# Patient Record
(demographics unavailable — no encounter records)

---

## 2024-10-25 NOTE — DATA REVIEWED
[FreeTextEntry1] : Labs:  10/18/2024:  A1c 5.7% TSH 0.09 Free T4  1.4  5/31/2023: Glucose 103  A1c 5.3 TSH 0.91 Free T4  1.3   5/26/2022:  TSH 0.88 Free T4  1.7 A1c 5.4%  1/28/2021: Glucose 100 A1c 5.5% TSH  0.51 Free T4  1.4  7/2/2020: TSH 1.55 Free T4  1.5  1/8/2020: TSH  0.17 Free T4  1.7 A1c  5.6 25(OH)D  41 25(OH)D  43  1/22/2019 glucose  99,  A1c  5.7 TSH  0.6, free T4  1.5 25(OH)D  38

## 2024-10-25 NOTE — ASSESSMENT
[FreeTextEntry1] : 64 year old female with hypothyroidism secondary to total thyroidectomy for benign disease, prediabetes and Hyperlipidemia here for follow up.  Recent labs show slight thyrotoxicosis on LT4 therapy after dose was changed.  1.  Hypothyroidism-  Reduce Synthroid back to 112 mcg daily (brand name is medically necessary as TSH levels are labile on generic LT4 in the past).  Repeat TFTs in 3 months.    2.  Impaired fasting glucose-   Continue lifestyle modification.   3.  Hyperlipidemia-  Continue lifestyle intervention.     Follow up in 3 months.

## 2024-10-25 NOTE — HISTORY OF PRESENT ILLNESS
[FreeTextEntry1] : Here for a routine follow up visit of hypothyroidism and IFG.  Quality:  hypothyroidism s/p total thyroidectomy for benign multinodular goiter (left nodule was over 4 cm in size) Severity: moderate Duration: years Modifying factors:  better with Synthroid  (had hair loss with generic LT4)  Current Regimen: Last refill was sent LT4 125 mcg daily, but was previously on 112 mcg daily.     Reports increasing palpitations recently.   Saw PCP and had EKG which was normal.    C/o weight gain and fatigue.

## 2024-10-25 NOTE — REVIEW OF SYSTEMS
[Fatigue] : fatigue [Recent Weight Gain (___ Lbs)] : recent weight gain: [unfilled] lbs [Palpitations] : palpitations [Tremors] : no tremors [Hot Flashes] : hot flashes

## 2024-10-25 NOTE — PHYSICAL EXAM
[Healthy Appearance] : healthy appearance [No Acute Distress] : no acute distress [Normal Sclera/Conjunctiva] : normal sclera/conjunctiva [No Proptosis] : no proptosis [No LAD] : no lymphadenopathy [Well Healed Scar] : well healed scar [No Respiratory Distress] : no respiratory distress [Clear to Auscultation] : lungs were clear to auscultation bilaterally [Normal S1, S2] : normal S1 and S2 [No Murmurs] : no murmurs [Normal Rate] : heart rate was normal [Regular Rhythm] : with a regular rhythm [Acanthosis Nigricans] : no acanthosis nigricans [Normal Affect] : the affect was normal [Normal Insight/Judgement] : insight and judgment were intact [Normal Mood] : the mood was normal [de-identified] : No palpable thyroid tissue

## 2025-01-24 NOTE — ASSESSMENT
[FreeTextEntry1] : 64 year old female with hypothyroidism secondary to total thyroidectomy for benign disease, prediabetes and Hyperlipidemia here for follow up.   Her TSH remains slightly suppressed on current dose of LT4.    1.  Hypothyroidism-   Reduce Synthroid to 100 mcg daily (2 boxes of samples provided to patient).  Repeat TFTs in 3 months. 2.  Impaired fasting glucose-   Continue lifestyle modification.   A1c is improving.   Follow up in 6 months.

## 2025-01-24 NOTE — HISTORY OF PRESENT ILLNESS
[FreeTextEntry1] : Here for a routine follow up visit of hypothyroidism and IFG. 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   Quality:  hypothyroidism s/p total thyroidectomy for benign multinodular goiter (left nodule was over 4 cm in size) Severity: moderate Duration: years Modifying factors:  better with Synthroid  (had hair loss with generic LT4)  Current Regimen: Synthroid 112 mcg daily (dose lowered last visit).   Had labile TSH levels on generic forms of LT4 in the past.

## 2025-01-24 NOTE — PHYSICAL EXAM
[Healthy Appearance] : healthy appearance [No Acute Distress] : no acute distress [Normal Sclera/Conjunctiva] : normal sclera/conjunctiva [No Proptosis] : no proptosis [No LAD] : no lymphadenopathy [Well Healed Scar] : well healed scar [No Respiratory Distress] : no respiratory distress [Clear to Auscultation] : lungs were clear to auscultation bilaterally [Normal S1, S2] : normal S1 and S2 [No Murmurs] : no murmurs [Normal Rate] : heart rate was normal [Regular Rhythm] : with a regular rhythm [Normal Affect] : the affect was normal [Normal Insight/Judgement] : insight and judgment were intact [Normal Mood] : the mood was normal [Acanthosis Nigricans] : no acanthosis nigricans [de-identified] : No palpable thyroid tissue

## 2025-01-24 NOTE — DATA REVIEWED
[FreeTextEntry1] : Labs:  1/3/2025: TSH 0.34 Free T4  1.4 Total T3  111  A1c 5.5  10/18/2024:  A1c 5.7% TSH 0.09 Free T4  1.4  5/31/2023: Glucose 103  A1c 5.3 TSH 0.91 Free T4  1.3   5/26/2022:  TSH 0.88 Free T4  1.7 A1c 5.4%  1/28/2021: Glucose 100 A1c 5.5% TSH  0.51 Free T4  1.4

## 2025-07-24 NOTE — ASSESSMENT
[FreeTextEntry1] : 64 year old female with hypothyroidism secondary to total thyroidectomy for benign disease, prediabetes and Hyperlipidemia here for follow up.  She is clinically and biochemically euthyroid on LT4 therapy.     1.  Hypothyroidism-   Continue current dose of Synthroid.    2.  Impaired fasting glucose-   Continue lifestyle modification.      Follow up in 6 months.

## 2025-07-24 NOTE — HISTORY OF PRESENT ILLNESS
[FreeTextEntry1] : Here for a routine follow up visit of hypothyroidism and IFG. PMH of DCIS s/p b/l lumpectomy and now having XRT.   Quality:  hypothyroidism s/p total thyroidectomy for benign multinodular goiter (left nodule was over 4 cm in size) Severity: moderate Duration: years Modifying factors:  better with Synthroid  (had hair loss with generic LT4)  Current Regimen: Synthroid 100 mcg daily (dose lowered last visit).   Had labile TSH levels on generic forms of LT4 in the past.      Recently getting some hot flashes after starting Arimidex.     Will be retiring in a few months.

## 2025-07-24 NOTE — PHYSICAL EXAM
[Healthy Appearance] : healthy appearance [No Acute Distress] : no acute distress [Normal Sclera/Conjunctiva] : normal sclera/conjunctiva [No Proptosis] : no proptosis [No LAD] : no lymphadenopathy [Well Healed Scar] : well healed scar [No Respiratory Distress] : no respiratory distress [Clear to Auscultation] : lungs were clear to auscultation bilaterally [Normal S1, S2] : normal S1 and S2 [No Murmurs] : no murmurs [Normal Rate] : heart rate was normal [Regular Rhythm] : with a regular rhythm [Normal Affect] : the affect was normal [Normal Insight/Judgement] : insight and judgment were intact [Normal Mood] : the mood was normal [Acanthosis Nigricans] : no acanthosis nigricans [de-identified] : No palpable thyroid tissue

## 2025-07-24 NOTE — REVIEW OF SYSTEMS
[Palpitations] : palpitations [Joint Pain] : joint pain [Hot Flashes] : hot flashes [FreeTextEntry5] : following with cardiology